# Patient Record
(demographics unavailable — no encounter records)

---

## 2025-02-07 NOTE — HISTORY OF PRESENT ILLNESS
[FreeTextEntry8] : 49 yo male with presents to the office to establish care. was recently admitted to [facility] for approximately eight days in january 2025 for ARDS, +flu, 'lung infection' , was given rx or metformin, prednisone, and pantoprazole on discharge. does not currently have labs/imaging for review, has patient discharge paperwork. unsure if diagnosed with diabetes, states was told sugar was a little high. denies any chest pain, shortness of breath, fever/chills. last saw primary care doctor approximately 20 years ago.

## 2025-02-07 NOTE — PHYSICAL EXAM
[Normal Rate] : normal rate  [Regular Rhythm] : with a regular rhythm [Normal S1, S2] : normal S1 and S2 [Soft] : abdomen soft [Non Tender] : non-tender [No HSM] : no HSM [Normal Bowel Sounds] : normal bowel sounds [Coordination Grossly Intact] : coordination grossly intact [No Focal Deficits] : no focal deficits [Normal Gait] : normal gait [Speech Grossly Normal] : speech grossly normal [Alert and Oriented x3] : oriented to person, place, and time [Normal Mood] : the mood was normal [Normal] : affect was normal and insight and judgment were intact

## 2025-03-14 NOTE — ADDENDUM
[FreeTextEntry1] : Documented by Mago Domingo acting as a scribe for Dr. Tyler Waggoner on 03/14/2025. All medical record entries made by the Scribe were at my, Dr. Tyler Waggoner's, direction and personally dictated by me on 03/14/2025. I have reviewed the chart and agree that the record accurately reflects my personal performance of the history, physical exam, assessment and plan. I have also personally directed, reviewed, and agree with the discharge instructions.

## 2025-03-14 NOTE — ASSESSMENT
[FreeTextEntry1] : Mr. HURD is a 50-year-old male originally from [facility], former 35 pack-year smoker (none since 1/2025) with a history of GERD, pre-T2DM, PNA (1/2025) who now comes to the office for an initial pulmonary evaluation for "SOB," COPD, s/p PNA 1/2025, abnormal CXR elevated R hemidiaphragm anteriorly, seasonal/environmental allergies, prior nicotine addiction, lung cancer screening, GERD, snoring ?ASHANTI   His shortness of breath is multifactorial due to: -poor mechanics of breathing -out of shape -overweight -pulmonary disease   -COPD at risk   -elevated R hemidiaphragm anteriorly -cardiac disease    -doubtful   Problem 1: COPD at risk -add Stiolto 2 puffs QD PRN -complete blood work: alpha-1-antitrypsin level -Alpha-1 antitrypsin (AAT) serum, phenotype, and mutation testing is being ordered on this patient with respiratory issues. A clinical history suggestive of Alpha-1 antitrypsin deficiency (AAT deficiency) can be seen in patients who present with [specific symptoms, e. g., persistent cough, shortness of breath, wheezing] and has been diagnosed with [relevant diagnoses, e.g., chronic obstructive pulmonary disease (COPD), emphysema]. AAT deficiency can lead to lung and liver disease. Accurate diagnosis through serum, phenotype, and mutation testing is critical for appropriate management. Understanding the patient's AAT levels and phenotype will inform therapeutic decisions, including the potential need for augmentation therapy. This therapy is crucial for patients with confirmed AAT deficiency to help improve lung function and quality of life. If AAT deficiency is confirmed, the patient and their family would benefit from genetic counseling to understand the hereditary aspects of the condition and implications for family members. Testing will provide baseline values for ongoing monitoring of the patient's condition and the effectiveness of any treatment initiated.  -COPD is a progressive disease and although it can't be cured, appropriate management can slow its progression, reduce frequency and severity of exacerbations, improve symptoms, and the patient's quality of life. Hospitalizations are the greatest contributor to the total COPD costs and account for up to 87% of total COPD related costs. Exacerbations are the main cause of admissions and subsequently account for the 40-75% of COPD costs. Inhaled maintenance therapy reduces the incidence of exacerbations in patients with stable COPD. Incorrect inhaler use and nonadherence are major obstacles to achieving COPD treatment goals. Many COPD patients have challenges (impaired inhalation, limited dexterity, reduced cognition) that limit their ability to correctly use their COPD treatment devices resulting in reduced symptom control. Of most importance is smoking cessation, early intervention with respiratory illnesses, and contemplation for pulmonary rehab to enhance quality of life.    Problem 2: abnormal CXR- elevated anterior R hemidiaphragm -complete fluoroscopy of the diaphragm   Problem 3: lung cancer screening (discussed 03/14/2025) -complete yearly LDCT of the chest  -USPSTF: Adults 50-80 years old with a 20 pack-year smoking history and either currently smoke or have quit within the past 15 years. LDCT should be completed yearly and can be stopped if the patient is over 15 years post smoking cessation, has poor life expectancy, or lacks the ability to undergo lung surgery.   Problem 4: allergies -add Olopatadine 0.6% 1 sniff BID  -complete blood work: asthma panel, food IgE panel, IgE level, eosinophil level, vitamin D level  -Environmental measures for allergies were encouraged including mattress and pillow covers, air purifier, and environmental controls.   Problem 5: GERD -continue Pantoprazole 40 mg QAM, pre-breakfast  -Rule of 2s: avoid eating too much, eating too late, eating too spicy, eating two hours before bed. -Things to avoid including overeating, spicy foods, tight clothing, eating within three hours of bed, this list is not all inclusive. -For treatment of reflux, possible options discussed including diet control, H2 blockers, PPIs, as well as coating motility agents discussed as treatment options. Timing of meals and proximity of last meal to sleep were discussed. If symptoms persist, a formal gastrointestinal evaluation is needed.    Problem 6: ?ASHANTI (elevated Mallampati class, nocturia, snoring) -complete home sleep study -Sleep apnea is associated with adverse clinical consequences which can affect most organ systems. Cardiovascular disease risk includes arrhythmias, atrial fibrillation, hypertension, coronary artery disease, and stroke. Metabolic disorders include diabetes type 2, non-alcoholic fatty liver disease. Mood disorder especially depression; and cognitive decline especially in the elderly. Associations with chronic reflux/Drummond's esophagus some but not all inclusive. -Reasons include arousal consistent with hypopnea; respiratory events most prominent in REM sleep or supine position; therefore sleep staging and body position are important for accurate diagnosis and estimation of AHI.    Problem 7: cardiac disease -recommended to follow up with Cardiologist if needed   Problem 8: poor breathing mechanics -Recommended Dorian Haddad breathing technique -Proper breathing techniques were reviewed with an emphasis on exhalation. Patient was instructed to breathe in for 1 second and out for 4 seconds. The patient was encouraged to not talk while walking.   Problem 9: overweight/ out of shape -Weight loss, exercise, and diet control were discussed and are highly encouraged. Treatment options are given such as aqua therapy, and contacting a nutritionist. Recommended to use the elliptical, stationary bike, less use of the treadmill.   Problem 10: health maintenance -recommended yearly flu shot after October 15, 2024 -recommended strep pneumonia vaccines: Prevnar-20 vaccine after the age of 65 -recommended early intervention for Upper Respiratory Infections (URIs) -recommended regular osteoporosis evaluations -recommended early dermatological evaluations -recommended after the age of 50 to the age of 70, colonoscopy every 5 years   F/P in 6-8 weeks. He is encouraged to call with any changes, concerns, or questions

## 2025-03-14 NOTE — REASON FOR VISIT
[Initial] : an initial visit [Language Line ] : provided by Language Line   [TextBox_44] : "SOB," COPD, s/p PNA 1/2025, abnormal CXR elevated R hemidiaphragm anteriorly, seasonal/environmental allergies, prior nicotine addiction, lung cancer screening, GERD, snoring ?ASHANTI [TWNoteComboBox1] : [place]

## 2025-03-14 NOTE — PROCEDURE
[FreeTextEntry1] : CXR (03/14/2025) reveals a normal sized heart; elevated R hemidiaphragm  Full PFT reveals normal flows; FEV1 was 3.09L which is 83% of predicted, with an 8% improvement on BD; normal lung volumes; mildly reduced diffusion at 19.66, which is 67% of predicted; normal flow volume loop. GINNY test revealed normal MIP max of 107%; mildly reduced MEP max of 68%  PFTs were performed to evaluate for SOB  6 minute walk test reveals a low saturation of 92%, max ; walked 407.5 meters   FENO was 16; a normal value being less than 25 Fractional exhaled nitric oxide (FENO) is regarded as a simple, noninvasive method for assessing eosinophilic airway inflammation. Produced by a variety of cells within the lung, nitric oxide (NO) concentrations are generally low in healthy individuals. However, high concentrations of NO appear to be involved in nonspecific host defense mechanisms and chronic inflammatory diseases such as asthma. The American Thoracic Society (ATS) therefore has recommended using FENO to aid in the diagnosis and monitoring of eosinophilic airway inflammation and asthma, and for identifying steroid responsive individuals whose chronic respiratory symptoms may be caused by airway inflammation.

## 2025-03-14 NOTE — HISTORY OF PRESENT ILLNESS
[TextBox_4] : Mr. HURD is a 50-year-old male originally from [facility], former 35 pack-year smoker (none since 1/2025) with a history of GERD, pre-T2DM, PNA (1/2025) presenting to the office today for an initial pulmonary evaluation. His chief complaint is  -he notes s/p ABx for PNA 1/2025. He was hospitalized for 8-9 days (1/3-1/20) in 2025 at [facility] -he notes he was wheezing before he was hospitalized because he was smoking. He denies wheezing at this time -he notes feeling generally well  -he denies SOB -he notes SOB when he'd shower, shortly after he was discharged. this has resolved -he denies nausea, emesis, fever, chills, sweats -he notes persistent sinus congestion, for which he uses a nasal spray at night -he denies heartburn/reflux -he notes snoring -he notes waking up well-rested -he notes sleeping for 7 hours -he notes nocturia X1 -he notes his memory and concentration are stable  -he doesn't know his neck size -he notes energy levels are 8/10 because he's not exercising -he notes he'll start exercising when he goes back to work -he denies any health complaints at this time  -he denies any headaches, chest pain, chest pressure, coughing, palpitations, constipation, diarrhea, vertigo, dysphagia, itchy eyes, itchy ears, leg swelling, leg pain, arthralgias, myalgias, or sour taste in the mouth.

## 2025-04-09 NOTE — HISTORY OF PRESENT ILLNESS
[Former] : Former [TextBox_13] : Patient is scheduled for a baseline LDCT for lung cancer screening. Shared decision making managed and documented by Dr. Waggoner. Attempts to reach patient unsuccessful. Chart review performed to confirm eligibility for LDCT.    No documented personal or family history of lung cancer. No documented s/s of lung cancer. Pt is a former smoker (2025) with a 35 pack year hx. [PacksperYear] : 35

## 2025-04-27 NOTE — INTERPRETER SERVICES
[Patient Declined  Services] : - None: Patient declined  services [Interpreters_Relationshiptopatient] : Sister, mother [TWNoteComboBox1] : [place]

## 2025-04-27 NOTE — INTERPRETER SERVICES
[Patient Declined  Services] : - None: Patient declined  services [Interpreters_Relationshiptopatient] : Sister, mother [TWNoteComboBox1] : [place]

## 2025-04-27 NOTE — HISTORY OF PRESENT ILLNESS
[FreeTextEntry1] : follow up [de-identified] : 51 yo male presents to the office with his sister (jeffrey) for a follow up on CT lung screening from pulmonologist. Sister states that she saw results on phone, saw "lung cancer" on results and thought patient has been diagnosed with lung cancer.

## 2025-04-27 NOTE — REVIEW OF SYSTEMS
[Cough] : cough [Negative] : Psychiatric [Shortness Of Breath] : no shortness of breath [Wheezing] : no wheezing [Dyspnea on Exertion] : not dyspnea on exertion

## 2025-04-27 NOTE — HISTORY OF PRESENT ILLNESS
[FreeTextEntry1] : follow up [de-identified] : 49 yo male presents to the office with his sister (jeffrey) for a follow up on CT lung screening from pulmonologist. Sister states that she saw results on phone, saw "lung cancer" on results and thought patient has been diagnosed with lung cancer.

## 2025-05-01 NOTE — REASON FOR VISIT
[Follow-Up] : a follow-up visit [Language Line ] : provided by Language Line   [TextBox_44] : "SOB," COPD, s/p PNA 1/2025, abnormal CXR elevated R hemidiaphragm anteriorly, seasonal/environmental allergies, prior nicotine addiction, lung cancer screening, GERD, snoring ?ASHANTI [TWNoteComboBox1] : Cam[place]

## 2025-05-01 NOTE — ASSESSMENT
[FreeTextEntry1] : Mr. HURD is a 50-year-old male originally from [facility], former 35 pack-year smoker (none since 1/2025) with a history of GERD, pre-T2DM, PNA (1/2025) who now comes to the office for a follow up pulmonary evaluation for "SOB," COPD, s/p PNA 1/2025, abnormal CXR elevated R hemidiaphragm anteriorly, seasonal/environmental allergies, prior nicotine addiction, lung cancer screening, GERD, snoring ?ASHANTI   His shortness of breath is multifactorial due to: -poor mechanics of breathing -out of shape -overweight -pulmonary disease   -COPD at risk   -elevated R hemidiaphragm anteriorly -cardiac disease    -doubtful   Problem 1: COPD at risk -Add Breztri 2 puffs BID (rinse and gargle) (advanced from Stiolto 2 puffs QD) -complete blood work: alpha-1-antitrypsin level (NC) -Alpha-1 antitrypsin (AAT) serum, phenotype, and mutation testing is being ordered on this patient with respiratory issues. A clinical history suggestive of Alpha-1 antitrypsin deficiency (AAT deficiency) can be seen in patients who present with [specific symptoms, e. g., persistent cough, shortness of breath, wheezing] and has been diagnosed with [relevant diagnoses, e.g., chronic obstructive pulmonary disease (COPD), emphysema]. AAT deficiency can lead to lung and liver disease. Accurate diagnosis through serum, phenotype, and mutation testing is critical for appropriate management. Understanding the patient's AAT levels and phenotype will inform therapeutic decisions, including the potential need for augmentation therapy. This therapy is crucial for patients with confirmed AAT deficiency to help improve lung function and quality of life. If AAT deficiency is confirmed, the patient and their family would benefit from genetic counseling to understand the hereditary aspects of the condition and implications for family members. Testing will provide baseline values for ongoing monitoring of the patient's condition and the effectiveness of any treatment initiated.  -COPD is a progressive disease and although it can't be cured, appropriate management can slow its progression, reduce frequency and severity of exacerbations, improve symptoms, and the patient's quality of life. Hospitalizations are the greatest contributor to the total COPD costs and account for up to 87% of total COPD related costs. Exacerbations are the main cause of admissions and subsequently account for the 40-75% of COPD costs. Inhaled maintenance therapy reduces the incidence of exacerbations in patients with stable COPD. Incorrect inhaler use and nonadherence are major obstacles to achieving COPD treatment goals. Many COPD patients have challenges (impaired inhalation, limited dexterity, reduced cognition) that limit their ability to correctly use their COPD treatment devices resulting in reduced symptom control. Of most importance is smoking cessation, early intervention with respiratory illnesses, and contemplation for pulmonary rehab to enhance quality of life.    Problem 2: abnormal CXR- elevated anterior R hemidiaphragm -complete fluoroscopy of the diaphragm (NC)   Problem 3: lung cancer screening  -s/p LDCT of the chest (4/10/2025)  0.5 cm RLL solid nodule; next 4/2026 -USPSTF: Adults 50-80 years old with a 20 pack-year smoking history and either currently smoke or have quit within the past 15 years. LDCT should be completed yearly and can be stopped if the patient is over 15 years post smoking cessation, has poor life expectancy, or lacks the ability to undergo lung surgery.   Problem 4: allergies -add Olopatadine 0.6% 1 sniff BID (NC) -complete blood work: asthma panel, food IgE panel, IgE level, eosinophil level, vitamin D level (NC) -Environmental measures for allergies were encouraged including mattress and pillow covers, air purifier, and environmental controls.   Problem 5: GERD -continue Pantoprazole 40 mg QAM, pre-breakfast  (NC) -Rule of 2s: avoid eating too much, eating too late, eating too spicy, eating two hours before bed. -Things to avoid including overeating, spicy foods, tight clothing, eating within three hours of bed, this list is not all inclusive. -For treatment of reflux, possible options discussed including diet control, H2 blockers, PPIs, as well as coating motility agents discussed as treatment options. Timing of meals and proximity of last meal to sleep were discussed. If symptoms persist, a formal gastrointestinal evaluation is needed.    Problem 6: ?ASHANTI (elevated Mallampati class, nocturia, snoring) -complete home sleep study (NC). [facility] -Sleep apnea is associated with adverse clinical consequences which can affect most organ systems. Cardiovascular disease risk includes arrhythmias, atrial fibrillation, hypertension, coronary artery disease, and stroke. Metabolic disorders include diabetes type 2, non-alcoholic fatty liver disease. Mood disorder especially depression; and cognitive decline especially in the elderly. Associations with chronic reflux/Drummond's esophagus some but not all inclusive. -Reasons include arousal consistent with hypopnea; respiratory events most prominent in REM sleep or supine position; therefore sleep staging and body position are important for accurate diagnosis and estimation of AHI.    Problem 7: ?Dysphagia/ ?Aspiration  h/o PNA 1/2025 -Referral to ENT (Dr. Huang, Dr. Marie, Dr. Campa, Dr. Marie)  Problem 8: cardiac disease -recommended to follow up with Cardiologist if needed   Problem 9: poor breathing mechanics -Proper breathing techniques were reviewed with an emphasis on exhalation. Patient was instructed to breathe in for 1 second and out for 4 seconds. The patient was encouraged to not talk while walking.   Problem 10: weight management  Recommended walking 30 min/day 5 days week minimum -Weight loss, exercise, and diet control were discussed and are highly encouraged. Treatment options are given such as aqua therapy, and contacting a nutritionist. Recommended to use the elliptical, stationary bike, less use of the treadmill.   F/P in 3 months with NP with spirometry; 6 months with Dr. Waggoner with PFTs He is encouraged to call with any changes, concerns, or questions

## 2025-05-01 NOTE — HISTORY OF PRESENT ILLNESS
[TextBox_4] : TODAY'S VISIT 5/1/2025 Mr. HURD is a 50-year-old male originally from [facility], former 35 pack-year smoker (none since 1/2025) with a history of GERD, pre-T2DM, PNA (1/2025) presenting to the office today for a follow up pulmonary evaluation. His chief complaint is  Mother present and sister Sabine present acts as ad-hoc  for pt  -reports he has had difficulty swallowing; states throat feels tight, narrows; does not cough after swallowing -reports Stiolto has not helped his symptoms  -reports he has rhinitis at times; is not using nasal spray -reports he does not have reflux -reports he has trouble breathing when he sleeps -reports sleep symptoms include: snoring; denies witnessed apneas, gasping/choking for air -reports he works in [employer] and has a very physical job  denies any headaches, nausea, vomiting, fever, chills, sweats, chest pain, chest pressure, palpitations, coughing, wheezing, fatigue, constipation, dizziness, leg swelling, leg pain, itchy eyes, itchy ears, heartburn, reflux or sour taste in the mouth.  INITIAL VISIT 3/14/2025 Mr. HURD is a 50-year-old male originally from [facility], former 35 pack-year smoker (none since 1/2025) with a history of GERD, pre-T2DM, PNA (1/2025) presenting to the office today for an initial pulmonary evaluation. His chief complaint is  -he notes s/p ABx for PNA 1/2025. He was hospitalized for 8-9 days (1/3-1/20) in 2025 at [facility] -he notes he was wheezing before he was hospitalized because he was smoking. He denies wheezing at this time -he notes feeling generally well  -he denies SOB -he notes SOB when he'd shower, shortly after he was discharged. this has resolved -he denies nausea, emesis, fever, chills, sweats -he notes persistent sinus congestion, for which he uses a nasal spray at night -he denies heartburn/reflux -he notes snoring -he notes waking up well-rested -he notes sleeping for 7 hours -he notes nocturia X1 -he notes his memory and concentration are stable  -he doesn't know his neck size -he notes energy levels are 8/10 because he's not exercising -he notes he'll start exercising when he goes back to work -he denies any health complaints at this time  -he denies any headaches, chest pain, chest pressure, coughing, palpitations, constipation, diarrhea, vertigo, dysphagia, itchy eyes, itchy ears, leg swelling, leg pain, arthralgias, myalgias, or sour taste in the mouth.

## 2025-05-01 NOTE — PROCEDURE
[FreeTextEntry1] : Spirometry  FVC: 90.9% FEV1: 101.8% FEF 25-75: 91.3%  FENO was 7; a normal value being less than 25 Fractional exhaled nitric oxide (FENO) is regarded as a simple, noninvasive method for assessing eosinophilic airway inflammation. Produced by a variety of cells within the lung, nitric oxide (NO) concentrations are generally low in healthy individuals. However, high concentrations of NO appear to be involved in nonspecific host defense mechanisms and chronic inflammatory diseases such as asthma. The American Thoracic Society (ATS) therefore has recommended using FENO to aid in the diagnosis and monitoring of eosinophilic airway inflammation and asthma, and for identifying steroid responsive individuals whose chronic respiratory symptoms may be caused by airway inflammation.

## 2025-07-30 NOTE — HEALTH RISK ASSESSMENT
[Very Good] : ~his/her~  mood as very good [No] : In the past 12 months have you used drugs other than those required for medical reasons? 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 [Audit-CScore] : 0 [de-identified] : active [de-identified] : healthy [[id number]] : 0 [Change in mental status noted] : No change in mental status noted [# Of Children ___] : has [unfilled] children [High Risk Behavior] : no high risk behavior [Fully functional (bathing, dressing, toileting, transferring, walking, feeding)] : Fully functional (bathing, dressing, toileting, transferring, walking, feeding) [Fully functional (using the telephone, shopping, preparing meals, housekeeping, doing laundry, using] : Fully functional and needs no help or supervision to perform IADLs (using the telephone, shopping, preparing meals, housekeeping, doing laundry, using transportation, managing medications and managing finances) [Reports changes in hearing] : Reports no changes in hearing [Reports changes in vision] : Reports no changes in vision [Reports normal functional visual acuity (ie: able to read med bottle)] : Reports normal functional visual acuity [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

## 2025-07-30 NOTE — HEALTH RISK ASSESSMENT
[Very Good] : ~his/her~  mood as very good [No] : In the past 12 months have you used drugs other than those required for medical reasons? 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 [Audit-CScore] : 0 [de-identified] : active [de-identified] : healthy [[id number]] : 0 [Change in mental status noted] : No change in mental status noted [# Of Children ___] : has [unfilled] children [High Risk Behavior] : no high risk behavior [Fully functional (bathing, dressing, toileting, transferring, walking, feeding)] : Fully functional (bathing, dressing, toileting, transferring, walking, feeding) [Fully functional (using the telephone, shopping, preparing meals, housekeeping, doing laundry, using] : Fully functional and needs no help or supervision to perform IADLs (using the telephone, shopping, preparing meals, housekeeping, doing laundry, using transportation, managing medications and managing finances) [Reports changes in hearing] : Reports no changes in hearing [Reports changes in vision] : Reports no changes in vision [Reports normal functional visual acuity (ie: able to read med bottle)] : Reports normal functional visual acuity [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